# Patient Record
Sex: FEMALE | Race: AMERICAN INDIAN OR ALASKA NATIVE | URBAN - METROPOLITAN AREA
[De-identification: names, ages, dates, MRNs, and addresses within clinical notes are randomized per-mention and may not be internally consistent; named-entity substitution may affect disease eponyms.]

---

## 2022-09-27 ENCOUNTER — TRANSFERRED RECORDS (OUTPATIENT)
Dept: HEALTH INFORMATION MANAGEMENT | Facility: CLINIC | Age: 22
End: 2022-09-27

## 2023-01-31 NOTE — PROGRESS NOTES
SUBJECTIVE:   CC: Lacy Calloway is an 22 year old woman who presents for preventive health visit.     Patient has been advised of split billing requirements and indicates understanding: Yes  Healthy Habits:    Do you get at least three servings of calcium containing foods daily (dairy, green leafy vegetables, etc.)? yes    Amount of exercise or daily activities, outside of work: 2 day(s) per week    Problems taking medications regularly not applicable    Medication side effects: No    Have you had an eye exam in the past two years? yes    Do you see a dentist twice per year? yes    Do you have sleep apnea, excessive snoring or daytime drowsiness?no      Lacy needs vaccine records for school.  She got a bunch of vaccines at the [facility] last year before coming to the  but unfortunately doesn't have records.  She does have documentation of 2 COVID vaccines and a recent flu shot as well as TB testing in the past year.     She reports no significant medical conditions, no prior surgeries, no significant family history.       Today's PHQ-2 Score:   PHQ-2 ( 1999 [employer]) 2/1/2023   Q1: Little interest or pleasure in doing things 0   Q2: Feeling down, depressed or hopeless 0   PHQ-2 Score 0     Have you ever done Advance Care Planning? (For example, a Health Directive, POLST, or a discussion with a medical provider or your loved ones about your wishes): No, advance care planning information given to patient to review.  Patient declined advance care planning discussion at this time.    Social History     Tobacco Use     Smoking status: Never     Smokeless tobacco: Never   Substance Use Topics     Alcohol use: Not Currently     If you drink alcohol do you typically have >3 drinks per day or >7 drinks per week? Not Applicable                     Reviewed orders with patient.  Reviewed health maintenance and updated orders accordingly - Yes          Pertinent mammograms are reviewed under the imaging tab.  History of  "abnormal Pap smear: NO - age 21-29 PAP every 3 years recommended     Reviewed and updated as needed this visit by clinical staff   Tobacco  Allergies     Surg Hx  Fam Hx          Reviewed and updated as needed this visit by Provider        Surg Hx  Fam Hx             ROS:    Answers for HPI/ROS submitted by the patient on 2/1/2023  General Symptoms: No  Skin Symptoms: No  HENT Symptoms: No  EYE SYMPTOMS: No  HEART SYMPTOMS: No  LUNG SYMPTOMS: No  INTESTINAL SYMPTOMS: No  URINARY SYMPTOMS: No  GYNECOLOGIC SYMPTOMS: No  BREAST SYMPTOMS: No  SKELETAL SYMPTOMS: No  BLOOD SYMPTOMS: No  NERVOUS SYSTEM SYMPTOMS: No  MENTAL HEALTH SYMPTOMS: No        OBJECTIVE:   /85 (BP Location: Right arm, Patient Position: Sitting, Cuff Size: Adult Regular)   Pulse 71   Ht 1.656 m (5' 5.2\")   Wt 58.1 kg (128 lb)   LMP  (LMP Unknown)   SpO2 100%   Breastfeeding No   BMI 21.17 kg/m    EXAM:  GENERAL: healthy, alert and no distress  EYES: Eyes grossly normal to inspection, PERRL and conjunctivae and sclerae normal  HENT: ear canals and TM's normal, nose and mouth without ulcers or lesions  NECK: no adenopathy, no asymmetry, masses, or scars and thyroid normal to palpation  RESP: lungs clear to auscultation - no rales, rhonchi or wheezes  CV: regular rate and rhythm, normal S1 S2, no S3 or S4, no murmur, click or rub, no peripheral edema   ABDOMEN: soft, nontender, no hepatosplenomegaly, no masses and bowel sounds normal  MS: no gross musculoskeletal defects noted, no edema  SKIN: no suspicious lesions or rashes  NEURO: Normal strength and tone, mentation intact and speech normal  PSYCH: mentation appears normal, affect normal/bright        ASSESSMENT/PLAN:   (Z00.00) Routine general medical examination at a health care facility  (primary encounter diagnosis)      Pap smear: she reports a normal Pap last year June    Immunizations: Flu vax UTD, completed 2 COVID vaccines.  She also needs documentation of MMR, varicella, " "Hep B, and TDaP for school.  She reports these are UTD but has no records and doesn't think she'll be able to obtain these.  Titers are acceptable for her school form for all but the TDaP vaccine, so we'll check titer.  Unfortunately, it seems like she'll just have to get another TDaP - she will obtain this at a pharmacy.     Lab Studies: declines chlamydia, HIV, and Hep C screening today    Form for school started, will finish when we get titer results and TDaP documentation and email back to her      (Z01.84) Immunity status testing  Comment: see above  Plan: Rubeola Antibody IgG, Rubella Antibody IgG,         Mumps Immune Status, IgG, Varicella Zoster         Virus Antibody IgG, Hepatitis B Surface         Antibody              Patient has been advised of split billing requirements and indicates understanding: n/a  COUNSELING:   Reviewed preventive health counseling, as reflected in patient instructions    Estimated body mass index is 21.17 kg/m  as calculated from the following:    Height as of this encounter: 1.656 m (5' 5.2\").    Weight as of this encounter: 58.1 kg (128 lb).        She reports that she has never smoked. She has never used smokeless tobacco.      Counseling Resources:  ATP IV Guidelines  Pooled Cohorts Equation Calculator  Breast Cancer Risk Calculator  BRCA-Related Cancer Risk Assessment: FHS-7 Tool  FRAX Risk Assessment  ICSI Preventive Guidelines  Dietary Guidelines for Americans, 2010  USDA's MyPlate  ASA Prophylaxis  Lung CA Screening    Pop Rivers MD  [facility] INTER[facility]IC[facility] [place]   "

## 2023-02-01 ENCOUNTER — LAB (OUTPATIENT)
Dept: LAB | Facility: CLINIC | Age: 23
End: 2023-02-01
Payer: COMMERCIAL

## 2023-02-01 ENCOUNTER — OFFICE VISIT (OUTPATIENT)
Dept: INTERNAL MEDICINE | Facility: CLINIC | Age: 23
End: 2023-02-01
Payer: COMMERCIAL

## 2023-02-01 VITALS
SYSTOLIC BLOOD PRESSURE: 120 MMHG | HEIGHT: 65 IN | WEIGHT: 128 LBS | BODY MASS INDEX: 21.33 KG/M2 | DIASTOLIC BLOOD PRESSURE: 85 MMHG | HEART RATE: 71 BPM | OXYGEN SATURATION: 100 %

## 2023-02-01 DIAGNOSIS — Z00.00 ROUTINE GENERAL MEDICAL EXAMINATION AT A HEALTH CARE FACILITY: Primary | ICD-10-CM

## 2023-02-01 DIAGNOSIS — Z01.84 IMMUNITY STATUS TESTING: ICD-10-CM

## 2023-02-01 PROCEDURE — 86765 RUBEOLA ANTIBODY: CPT | Mod: 90 | Performed by: PATHOLOGY

## 2023-02-01 PROCEDURE — 36415 COLL VENOUS BLD VENIPUNCTURE: CPT | Performed by: PATHOLOGY

## 2023-02-01 PROCEDURE — 99000 SPECIMEN HANDLING OFFICE-LAB: CPT | Performed by: PATHOLOGY

## 2023-02-01 PROCEDURE — 99385 PREV VISIT NEW AGE 18-39: CPT | Performed by: INTERNAL MEDICINE

## 2023-02-01 PROCEDURE — 86706 HEP B SURFACE ANTIBODY: CPT | Mod: 90 | Performed by: PATHOLOGY

## 2023-02-01 PROCEDURE — 86762 RUBELLA ANTIBODY: CPT | Mod: 90 | Performed by: PATHOLOGY

## 2023-02-01 PROCEDURE — 86735 MUMPS ANTIBODY: CPT | Mod: 90 | Performed by: PATHOLOGY

## 2023-02-01 PROCEDURE — 86787 VARICELLA-ZOSTER ANTIBODY: CPT | Mod: 90 | Performed by: PATHOLOGY

## 2023-02-01 NOTE — NURSING NOTE
Lacy Calloway is a 22 year old female patient that presents today in clinic for the following:    Chief Complaint   Patient presents with     Establish Care     Physical, vaccines     The patient's allergies and medications were reviewed as noted. A set of vitals were recorded as noted without incident. The patient does not have any other questions for the provider.    Mau Mcdaniel, EMT at 12:36 PM on 2/1/2023

## 2023-02-02 LAB
HBV SURFACE AB SERPL IA-ACNC: 77.47 M[IU]/ML
HBV SURFACE AB SERPL IA-ACNC: REACTIVE M[IU]/ML
MEV IGG SER IA-ACNC: 68 AU/ML
MEV IGG SER IA-ACNC: POSITIVE
MUMPS ANTIBODY IGG INSTRUMENT VALUE: 120 AU/ML
MUV IGG SER QL IA: POSITIVE
RUBV IGG SERPL QL IA: 3.25 INDEX
RUBV IGG SERPL QL IA: POSITIVE
VZV IGG SER QL IA: <10 INDEX
VZV IGG SER QL IA: NORMAL

## 2023-02-06 ENCOUNTER — MYC MEDICAL ADVICE (OUTPATIENT)
Dept: INTERNAL MEDICINE | Facility: CLINIC | Age: 23
End: 2023-02-06
Payer: COMMERCIAL

## 2023-02-09 ENCOUNTER — DOCUMENTATION ONLY (OUTPATIENT)
Dept: INTERNAL MEDICINE | Facility: CLINIC | Age: 23
End: 2023-02-09
Payer: COMMERCIAL

## 2023-02-09 NOTE — PROGRESS NOTES
Type of Form Received: physical exam    Form Received (Date) 2/9/23   Form Filled out Yes 2/9/23   Placed in provider folder Yes

## 2023-02-12 ENCOUNTER — HEALTH MAINTENANCE LETTER (OUTPATIENT)
Age: 23
End: 2023-02-12

## 2023-03-15 ENCOUNTER — OFFICE VISIT (OUTPATIENT)
Dept: URGENT CARE | Facility: URGENT CARE | Age: 23
End: 2023-03-15
Payer: COMMERCIAL

## 2023-03-15 VITALS
OXYGEN SATURATION: 100 % | SYSTOLIC BLOOD PRESSURE: 117 MMHG | HEART RATE: 58 BPM | DIASTOLIC BLOOD PRESSURE: 83 MMHG | RESPIRATION RATE: 20 BRPM | TEMPERATURE: 97.7 F | WEIGHT: 129 LBS | BODY MASS INDEX: 21.34 KG/M2

## 2023-03-15 DIAGNOSIS — H66.90 ACUTE OTITIS MEDIA, UNSPECIFIED OTITIS MEDIA TYPE: ICD-10-CM

## 2023-03-15 DIAGNOSIS — H92.03 OTALGIA OF BOTH EARS: ICD-10-CM

## 2023-03-15 DIAGNOSIS — T70.29XA BAROTRAUMA, INITIAL ENCOUNTER: Primary | ICD-10-CM

## 2023-03-15 PROCEDURE — 99214 OFFICE O/P EST MOD 30 MIN: CPT | Performed by: PHYSICIAN ASSISTANT

## 2023-03-15 RX ORDER — PREDNISONE 20 MG/1
20 TABLET ORAL DAILY
Qty: 5 TABLET | Refills: 0 | Status: SHIPPED | OUTPATIENT
Start: 2023-03-15 | End: 2023-03-20

## 2023-03-15 RX ORDER — FLUTICASONE PROPIONATE 50 MCG
1 SPRAY, SUSPENSION (ML) NASAL DAILY
Qty: 16 G | Refills: 0 | Status: SHIPPED | OUTPATIENT
Start: 2023-03-15

## 2023-03-15 RX ORDER — AMOXICILLIN 875 MG
875 TABLET ORAL 2 TIMES DAILY
Qty: 20 TABLET | Refills: 0 | Status: SHIPPED | OUTPATIENT
Start: 2023-03-15 | End: 2023-03-25

## 2023-03-15 RX ORDER — ACETAMINOPHEN 500 MG
1000 TABLET ORAL ONCE
Status: COMPLETED | OUTPATIENT
Start: 2023-03-15 | End: 2023-03-15

## 2023-03-15 RX ADMIN — Medication 1000 MG: at 15:05

## 2023-03-15 NOTE — PATIENT INSTRUCTIONS
(T70.29XA) Barotrauma, initial encounter  (primary encounter diagnosis)  Comment:   Plan: fluticasone (FLONASE) 50 MCG/ACT nasal spray,         predniSONE (DELTASONE) 20 MG tablet        Viscous lidocaine placed in ear ear canal with good relief.      (H92.03) Otalgia of both ears  Comment:   Plan: acetaminophen (TYLENOL) tablet 1,000 mg,         predniSONE (DELTASONE) 20 MG tablet            (H66.90) Acute otitis media, unspecified otitis media type  Comment:   Plan: amoxicillin (AMOXIL) 875 MG tablet          Follow up with primary clinic for ear re-check in 10 days, sooner should symptoms worsen or persist.

## 2023-03-15 NOTE — PROGRESS NOTES
Patient presents with:  Ear Problem: Pain in both ears since Sunday.  Flight on Monday made worse.      (T70.29XA) Barotrauma, initial encounter  (primary encounter diagnosis)  Comment:   Plan: fluticasone (FLONASE) 50 MCG/ACT nasal spray,         predniSONE (DELTASONE) 20 MG tablet        Viscous lidocaine placed in ear ear canal with good relief.      (H92.03) Otalgia of both ears  Comment:   Plan: acetaminophen (TYLENOL) tablet 1,000 mg,         predniSONE (DELTASONE) 20 MG tablet            (H66.90) Acute otitis media, unspecified otitis media type  Comment:   Plan: amoxicillin (AMOXIL) 875 MG tablet          Follow up with primary clinic for ear re-check in 10 days, sooner should symptoms worsen or persist.            Viscous lidocaine in each ear    37 minutes spent on the date of the encounter doing chart review, review of test results, interpretation of tests, patient visit and documentation       SUBJECTIVE:   Miya Calloway is a 22 year old female who presents today with bilateral ear pain since travelling by plane to [facility] recently.  She endorses cold symptoms prior to her flight.  Denies any fevers or throat pain or cough.  Denies any ear drainage.      She last took tylenol yesterday.      SH: was in [facility] visiting family for 5 days    No past medical history on file.      Current Outpatient Medications   Medication Sig Dispense Refill     Multiple Vitamins-Iron (DAILY-BARBARA/IRON/BETA-CAROTENE) TABS TAKE 1 TABLET BY MOUTH DAILY. (Patient not taking: Reported on 10/19/2020) 30 tablet 7     Social History     Tobacco Use     Smoking status: Never Smoker     Smokeless tobacco: Never Used   Substance Use Topics     Alcohol use: Not on file     Family History   Problem Relation Age of Onset     Diabetes Mother      Diabetes Father          ROS:    10 point ROS of systems including Constitutional, Eyes, Respiratory, Cardiovascular, Gastroenterology, Genitourinary, Integumentary, Muscularskeletal,  Psychiatric ,neurological were all negative except for pertinent positives noted in my HPI       OBJECTIVE:  /83   Pulse 58   Temp 97.7  F (36.5  C) (Tympanic)   Resp 20   Wt 58.5 kg (129 lb)   LMP  (LMP Unknown)   SpO2 100%   BMI 21.34 kg/m    Physical Exam:  GENERAL APPEARANCE: healthy, alert and no distress  EYES: EOMI,  PERRL, conjunctiva clear  HENT: ear canals normal.  Both TM's are erythematous and dull, retracted.  Nose with boggy turbinates and mouth without ulcers, erythema or lesions  NECK: supple, nontender, no lymphadenopathy  RESP: lungs clear to auscultation - no rales, rhonchi or wheezes  CV: regular rates and rhythm, normal S1 S2, no murmur noted  SKIN: no suspicious lesions or rashes

## 2024-03-10 ENCOUNTER — HEALTH MAINTENANCE LETTER (OUTPATIENT)
Age: 24
End: 2024-03-10

## 2025-03-16 ENCOUNTER — HEALTH MAINTENANCE LETTER (OUTPATIENT)
Age: 25
End: 2025-03-16